# Patient Record
Sex: MALE | Race: WHITE | Employment: FULL TIME | URBAN - METROPOLITAN AREA
[De-identification: names, ages, dates, MRNs, and addresses within clinical notes are randomized per-mention and may not be internally consistent; named-entity substitution may affect disease eponyms.]

---

## 2018-12-18 ENCOUNTER — ANESTHESIA EVENT (OUTPATIENT)
Dept: SURGERY | Facility: CLINIC | Age: 43
End: 2018-12-18
Payer: COMMERCIAL

## 2018-12-19 ENCOUNTER — HOSPITAL ENCOUNTER (INPATIENT)
Facility: CLINIC | Age: 43
LOS: 1 days | Discharge: HOME | End: 2018-12-20
Attending: ORTHOPAEDIC SURGERY | Admitting: ORTHOPAEDIC SURGERY
Payer: COMMERCIAL

## 2018-12-19 ENCOUNTER — APPOINTMENT (OUTPATIENT)
Dept: GENERAL RADIOLOGY | Facility: CLINIC | Age: 43
End: 2018-12-19
Attending: ORTHOPAEDIC SURGERY
Payer: COMMERCIAL

## 2018-12-19 ENCOUNTER — ANESTHESIA (OUTPATIENT)
Dept: SURGERY | Facility: CLINIC | Age: 43
End: 2018-12-19
Payer: COMMERCIAL

## 2018-12-19 DIAGNOSIS — M25.561 ACUTE PAIN OF RIGHT KNEE: Primary | ICD-10-CM

## 2018-12-19 DIAGNOSIS — Z98.890 STATUS POST OSTEOTOMY: ICD-10-CM

## 2018-12-19 PROBLEM — M25.569 KNEE PAIN: Status: ACTIVE | Noted: 2018-12-19

## 2018-12-19 LAB
GLUCOSE BLDC GLUCOMTR-MCNC: 110 MG/DL (ref 70–99)
HGB BLD-MCNC: 14.7 G/DL (ref 13.3–17.7)

## 2018-12-19 PROCEDURE — 0QSB04Z REPOSITION RIGHT LOWER FEMUR WITH INTERNAL FIXATION DEVICE, OPEN APPROACH: ICD-10-PCS | Performed by: ORTHOPAEDIC SURGERY

## 2018-12-19 PROCEDURE — 25000128 H RX IP 250 OP 636: Performed by: ANESTHESIOLOGY

## 2018-12-19 PROCEDURE — 40000278 XR SURGERY CARM FLUORO LESS THAN 5 MIN: Mod: TC

## 2018-12-19 PROCEDURE — 40000170 ZZH STATISTIC PRE-PROCEDURE ASSESSMENT II: Performed by: ORTHOPAEDIC SURGERY

## 2018-12-19 PROCEDURE — 0QUB0KZ SUPPLEMENT RIGHT LOWER FEMUR WITH NONAUTOLOGOUS TISSUE SUBSTITUTE, OPEN APPROACH: ICD-10-PCS | Performed by: ORTHOPAEDIC SURGERY

## 2018-12-19 PROCEDURE — 12000001 ZZH R&B MED SURG/OB UMMC

## 2018-12-19 PROCEDURE — 36000064 ZZH SURGERY LEVEL 4 EA 15 ADDTL MIN - UMMC: Performed by: ORTHOPAEDIC SURGERY

## 2018-12-19 PROCEDURE — 27210794 ZZH OR GENERAL SUPPLY STERILE: Performed by: ORTHOPAEDIC SURGERY

## 2018-12-19 PROCEDURE — 37000009 ZZH ANESTHESIA TECHNICAL FEE, EACH ADDTL 15 MIN: Performed by: ORTHOPAEDIC SURGERY

## 2018-12-19 PROCEDURE — 25000125 ZZHC RX 250: Performed by: NURSE ANESTHETIST, CERTIFIED REGISTERED

## 2018-12-19 PROCEDURE — 0SJC4ZZ INSPECTION OF RIGHT KNEE JOINT, PERCUTANEOUS ENDOSCOPIC APPROACH: ICD-10-PCS | Performed by: ORTHOPAEDIC SURGERY

## 2018-12-19 PROCEDURE — C9290 INJ, BUPIVACAINE LIPOSOME: HCPCS | Performed by: ANESTHESIOLOGY

## 2018-12-19 PROCEDURE — C1762 CONN TISS, HUMAN(INC FASCIA): HCPCS | Performed by: ORTHOPAEDIC SURGERY

## 2018-12-19 PROCEDURE — 25000566 ZZH SEVOFLURANE, EA 15 MIN: Performed by: ORTHOPAEDIC SURGERY

## 2018-12-19 PROCEDURE — 25800025 ZZH RX 258: Performed by: ORTHOPAEDIC SURGERY

## 2018-12-19 PROCEDURE — 0SUC0KZ SUPPLEMENT RIGHT KNEE JOINT WITH NONAUTOLOGOUS TISSUE SUBSTITUTE, OPEN APPROACH: ICD-10-PCS | Performed by: ORTHOPAEDIC SURGERY

## 2018-12-19 PROCEDURE — 37000008 ZZH ANESTHESIA TECHNICAL FEE, 1ST 30 MIN: Performed by: ORTHOPAEDIC SURGERY

## 2018-12-19 PROCEDURE — 25000132 ZZH RX MED GY IP 250 OP 250 PS 637: Performed by: PHYSICIAN ASSISTANT

## 2018-12-19 PROCEDURE — 85018 HEMOGLOBIN: CPT | Performed by: STUDENT IN AN ORGANIZED HEALTH CARE EDUCATION/TRAINING PROGRAM

## 2018-12-19 PROCEDURE — 36415 COLL VENOUS BLD VENIPUNCTURE: CPT | Performed by: STUDENT IN AN ORGANIZED HEALTH CARE EDUCATION/TRAINING PROGRAM

## 2018-12-19 PROCEDURE — 25000128 H RX IP 250 OP 636: Performed by: PHYSICIAN ASSISTANT

## 2018-12-19 PROCEDURE — 25000128 H RX IP 250 OP 636: Performed by: ORTHOPAEDIC SURGERY

## 2018-12-19 PROCEDURE — 25000125 ZZHC RX 250: Performed by: ORTHOPAEDIC SURGERY

## 2018-12-19 PROCEDURE — 71000015 ZZH RECOVERY PHASE 1 LEVEL 2 EA ADDTL HR: Performed by: ORTHOPAEDIC SURGERY

## 2018-12-19 PROCEDURE — 00000146 ZZHCL STATISTIC GLUCOSE BY METER IP

## 2018-12-19 PROCEDURE — 25000128 H RX IP 250 OP 636: Performed by: STUDENT IN AN ORGANIZED HEALTH CARE EDUCATION/TRAINING PROGRAM

## 2018-12-19 PROCEDURE — 25000125 ZZHC RX 250: Performed by: PHYSICIAN ASSISTANT

## 2018-12-19 PROCEDURE — 71000014 ZZH RECOVERY PHASE 1 LEVEL 2 FIRST HR: Performed by: ORTHOPAEDIC SURGERY

## 2018-12-19 PROCEDURE — C1713 ANCHOR/SCREW BN/BN,TIS/BN: HCPCS | Performed by: ORTHOPAEDIC SURGERY

## 2018-12-19 PROCEDURE — 25000132 ZZH RX MED GY IP 250 OP 250 PS 637: Performed by: STUDENT IN AN ORGANIZED HEALTH CARE EDUCATION/TRAINING PROGRAM

## 2018-12-19 PROCEDURE — 36000066 ZZH SURGERY LEVEL 4 W FLUORO 1ST 30 MIN - UMMC: Performed by: ORTHOPAEDIC SURGERY

## 2018-12-19 PROCEDURE — 25000128 H RX IP 250 OP 636: Performed by: NURSE ANESTHETIST, CERTIFIED REGISTERED

## 2018-12-19 DEVICE — IMPLANTABLE DEVICE: Type: IMPLANTABLE DEVICE | Site: KNEE | Status: FUNCTIONAL

## 2018-12-19 DEVICE — GRAFT BONE PUTTY DBX 10ML 038100: Type: IMPLANTABLE DEVICE | Site: KNEE | Status: FUNCTIONAL

## 2018-12-19 DEVICE — GRAFT BONE CHIPS CANC 30ML 400150: Type: IMPLANTABLE DEVICE | Site: KNEE | Status: FUNCTIONAL

## 2018-12-19 DEVICE — IMP ANCHOR ARTHREX HTO IBALANCE CANCELLOUS 24MM AR-13401-24: Type: IMPLANTABLE DEVICE | Site: KNEE | Status: FUNCTIONAL

## 2018-12-19 RX ORDER — AMOXICILLIN 250 MG
1 CAPSULE ORAL 2 TIMES DAILY
Status: DISCONTINUED | OUTPATIENT
Start: 2018-12-19 | End: 2018-12-20 | Stop reason: HOSPADM

## 2018-12-19 RX ORDER — SODIUM CHLORIDE 9 MG/ML
INJECTION, SOLUTION INTRAVENOUS CONTINUOUS
Status: DISCONTINUED | OUTPATIENT
Start: 2018-12-19 | End: 2018-12-20 | Stop reason: HOSPADM

## 2018-12-19 RX ORDER — NALOXONE HYDROCHLORIDE 0.4 MG/ML
.1-.4 INJECTION, SOLUTION INTRAMUSCULAR; INTRAVENOUS; SUBCUTANEOUS
Status: DISCONTINUED | OUTPATIENT
Start: 2018-12-19 | End: 2018-12-20 | Stop reason: HOSPADM

## 2018-12-19 RX ORDER — MAGNESIUM HYDROXIDE 1200 MG/15ML
LIQUID ORAL PRN
Status: DISCONTINUED | OUTPATIENT
Start: 2018-12-19 | End: 2018-12-19 | Stop reason: HOSPADM

## 2018-12-19 RX ORDER — LIDOCAINE HYDROCHLORIDE 20 MG/ML
INJECTION, SOLUTION INFILTRATION; PERINEURAL PRN
Status: DISCONTINUED | OUTPATIENT
Start: 2018-12-19 | End: 2018-12-19

## 2018-12-19 RX ORDER — FENTANYL CITRATE 50 UG/ML
25-50 INJECTION, SOLUTION INTRAMUSCULAR; INTRAVENOUS
Status: DISCONTINUED | OUTPATIENT
Start: 2018-12-19 | End: 2018-12-19 | Stop reason: HOSPADM

## 2018-12-19 RX ORDER — PAROXETINE 10 MG/1
10 TABLET, FILM COATED ORAL EVERY MORNING
COMMUNITY

## 2018-12-19 RX ORDER — SODIUM CHLORIDE, SODIUM LACTATE, POTASSIUM CHLORIDE, CALCIUM CHLORIDE 600; 310; 30; 20 MG/100ML; MG/100ML; MG/100ML; MG/100ML
INJECTION, SOLUTION INTRAVENOUS CONTINUOUS PRN
Status: DISCONTINUED | OUTPATIENT
Start: 2018-12-19 | End: 2018-12-19

## 2018-12-19 RX ORDER — CEFAZOLIN SODIUM 2 G/100ML
2 INJECTION, SOLUTION INTRAVENOUS
Status: DISCONTINUED | OUTPATIENT
Start: 2018-12-19 | End: 2018-12-19 | Stop reason: HOSPADM

## 2018-12-19 RX ORDER — EPHEDRINE SULFATE 50 MG/ML
INJECTION, SOLUTION INTRAMUSCULAR; INTRAVENOUS; SUBCUTANEOUS PRN
Status: DISCONTINUED | OUTPATIENT
Start: 2018-12-19 | End: 2018-12-19

## 2018-12-19 RX ORDER — NALOXONE HYDROCHLORIDE 0.4 MG/ML
.1-.4 INJECTION, SOLUTION INTRAMUSCULAR; INTRAVENOUS; SUBCUTANEOUS
Status: DISCONTINUED | OUTPATIENT
Start: 2018-12-19 | End: 2018-12-19 | Stop reason: HOSPADM

## 2018-12-19 RX ORDER — PROPOFOL 10 MG/ML
INJECTION, EMULSION INTRAVENOUS PRN
Status: DISCONTINUED | OUTPATIENT
Start: 2018-12-19 | End: 2018-12-19

## 2018-12-19 RX ORDER — PROCHLORPERAZINE MALEATE 5 MG
10 TABLET ORAL EVERY 6 HOURS PRN
Status: DISCONTINUED | OUTPATIENT
Start: 2018-12-19 | End: 2018-12-20 | Stop reason: HOSPADM

## 2018-12-19 RX ORDER — ONDANSETRON 2 MG/ML
4 INJECTION INTRAMUSCULAR; INTRAVENOUS EVERY 30 MIN PRN
Status: DISCONTINUED | OUTPATIENT
Start: 2018-12-19 | End: 2018-12-19 | Stop reason: HOSPADM

## 2018-12-19 RX ORDER — METOCLOPRAMIDE HYDROCHLORIDE 5 MG/ML
10 INJECTION INTRAMUSCULAR; INTRAVENOUS EVERY 6 HOURS PRN
Status: DISCONTINUED | OUTPATIENT
Start: 2018-12-19 | End: 2018-12-20 | Stop reason: HOSPADM

## 2018-12-19 RX ORDER — SODIUM CHLORIDE, SODIUM LACTATE, POTASSIUM CHLORIDE, CALCIUM CHLORIDE 600; 310; 30; 20 MG/100ML; MG/100ML; MG/100ML; MG/100ML
INJECTION, SOLUTION INTRAVENOUS CONTINUOUS
Status: DISCONTINUED | OUTPATIENT
Start: 2018-12-19 | End: 2018-12-19 | Stop reason: HOSPADM

## 2018-12-19 RX ORDER — HYDROXYZINE HYDROCHLORIDE 25 MG/1
25 TABLET, FILM COATED ORAL EVERY 6 HOURS PRN
Status: DISCONTINUED | OUTPATIENT
Start: 2018-12-19 | End: 2018-12-20 | Stop reason: HOSPADM

## 2018-12-19 RX ORDER — LIDOCAINE 40 MG/G
CREAM TOPICAL
Status: DISCONTINUED | OUTPATIENT
Start: 2018-12-19 | End: 2018-12-20 | Stop reason: HOSPADM

## 2018-12-19 RX ORDER — ACETAMINOPHEN 325 MG/1
975 TABLET ORAL EVERY 8 HOURS
Status: DISCONTINUED | OUTPATIENT
Start: 2018-12-19 | End: 2018-12-20 | Stop reason: HOSPADM

## 2018-12-19 RX ORDER — ONDANSETRON 2 MG/ML
INJECTION INTRAMUSCULAR; INTRAVENOUS PRN
Status: DISCONTINUED | OUTPATIENT
Start: 2018-12-19 | End: 2018-12-19

## 2018-12-19 RX ORDER — PROPOFOL 10 MG/ML
INJECTION, EMULSION INTRAVENOUS CONTINUOUS PRN
Status: DISCONTINUED | OUTPATIENT
Start: 2018-12-19 | End: 2018-12-19

## 2018-12-19 RX ORDER — AMOXICILLIN 250 MG
2 CAPSULE ORAL 2 TIMES DAILY
Status: DISCONTINUED | OUTPATIENT
Start: 2018-12-19 | End: 2018-12-20 | Stop reason: HOSPADM

## 2018-12-19 RX ORDER — CEFAZOLIN SODIUM 1 G/3ML
1 INJECTION, POWDER, FOR SOLUTION INTRAMUSCULAR; INTRAVENOUS EVERY 8 HOURS
Status: COMPLETED | OUTPATIENT
Start: 2018-12-19 | End: 2018-12-20

## 2018-12-19 RX ORDER — ACETAMINOPHEN 325 MG/1
975 TABLET ORAL ONCE
Status: COMPLETED | OUTPATIENT
Start: 2018-12-19 | End: 2018-12-19

## 2018-12-19 RX ORDER — KETOROLAC TROMETHAMINE 30 MG/ML
INJECTION, SOLUTION INTRAMUSCULAR; INTRAVENOUS PRN
Status: DISCONTINUED | OUTPATIENT
Start: 2018-12-19 | End: 2018-12-19

## 2018-12-19 RX ORDER — ONDANSETRON 4 MG/1
4 TABLET, ORALLY DISINTEGRATING ORAL EVERY 6 HOURS PRN
Status: DISCONTINUED | OUTPATIENT
Start: 2018-12-19 | End: 2018-12-20 | Stop reason: HOSPADM

## 2018-12-19 RX ORDER — ONDANSETRON 4 MG/1
4 TABLET, ORALLY DISINTEGRATING ORAL EVERY 30 MIN PRN
Status: DISCONTINUED | OUTPATIENT
Start: 2018-12-19 | End: 2018-12-19 | Stop reason: HOSPADM

## 2018-12-19 RX ORDER — BUPIVACAINE HYDROCHLORIDE 2.5 MG/ML
INJECTION, SOLUTION EPIDURAL; INFILTRATION; INTRACAUDAL PRN
Status: DISCONTINUED | OUTPATIENT
Start: 2018-12-19 | End: 2018-12-19

## 2018-12-19 RX ORDER — MORPHINE SULFATE 15 MG/1
15 TABLET, FILM COATED, EXTENDED RELEASE ORAL EVERY 12 HOURS
Status: DISCONTINUED | OUTPATIENT
Start: 2018-12-19 | End: 2018-12-20 | Stop reason: HOSPADM

## 2018-12-19 RX ORDER — ATORVASTATIN CALCIUM 40 MG/1
40 TABLET, FILM COATED ORAL DAILY
COMMUNITY

## 2018-12-19 RX ORDER — FENTANYL CITRATE 50 UG/ML
INJECTION, SOLUTION INTRAMUSCULAR; INTRAVENOUS PRN
Status: DISCONTINUED | OUTPATIENT
Start: 2018-12-19 | End: 2018-12-19

## 2018-12-19 RX ORDER — LIDOCAINE 40 MG/G
CREAM TOPICAL
Status: DISCONTINUED | OUTPATIENT
Start: 2018-12-19 | End: 2018-12-19 | Stop reason: HOSPADM

## 2018-12-19 RX ORDER — LOSARTAN POTASSIUM 25 MG/1
25 TABLET ORAL DAILY
COMMUNITY

## 2018-12-19 RX ORDER — OXYCODONE HYDROCHLORIDE 5 MG/1
5 TABLET ORAL EVERY 4 HOURS PRN
Status: DISCONTINUED | OUTPATIENT
Start: 2018-12-19 | End: 2018-12-19

## 2018-12-19 RX ORDER — ONDANSETRON 2 MG/ML
4 INJECTION INTRAMUSCULAR; INTRAVENOUS EVERY 6 HOURS PRN
Status: DISCONTINUED | OUTPATIENT
Start: 2018-12-19 | End: 2018-12-20 | Stop reason: HOSPADM

## 2018-12-19 RX ORDER — BUPIVACAINE HYDROCHLORIDE AND EPINEPHRINE 2.5; 5 MG/ML; UG/ML
INJECTION, SOLUTION INFILTRATION; PERINEURAL PRN
Status: DISCONTINUED | OUTPATIENT
Start: 2018-12-19 | End: 2018-12-19 | Stop reason: HOSPADM

## 2018-12-19 RX ORDER — CEFAZOLIN SODIUM 1 G/3ML
1 INJECTION, POWDER, FOR SOLUTION INTRAMUSCULAR; INTRAVENOUS SEE ADMIN INSTRUCTIONS
Status: DISCONTINUED | OUTPATIENT
Start: 2018-12-19 | End: 2018-12-19 | Stop reason: HOSPADM

## 2018-12-19 RX ORDER — OXYCODONE HYDROCHLORIDE 5 MG/1
5-10 TABLET ORAL
Status: DISCONTINUED | OUTPATIENT
Start: 2018-12-19 | End: 2018-12-20 | Stop reason: HOSPADM

## 2018-12-19 RX ORDER — METOCLOPRAMIDE 10 MG/1
10 TABLET ORAL EVERY 6 HOURS PRN
Status: DISCONTINUED | OUTPATIENT
Start: 2018-12-19 | End: 2018-12-20 | Stop reason: HOSPADM

## 2018-12-19 RX ORDER — FLUMAZENIL 0.1 MG/ML
0.2 INJECTION, SOLUTION INTRAVENOUS
Status: DISCONTINUED | OUTPATIENT
Start: 2018-12-19 | End: 2018-12-19 | Stop reason: HOSPADM

## 2018-12-19 RX ORDER — HYDROMORPHONE HYDROCHLORIDE 1 MG/ML
.3-.5 INJECTION, SOLUTION INTRAMUSCULAR; INTRAVENOUS; SUBCUTANEOUS EVERY 10 MIN PRN
Status: DISCONTINUED | OUTPATIENT
Start: 2018-12-19 | End: 2018-12-19 | Stop reason: HOSPADM

## 2018-12-19 RX ORDER — ACETAMINOPHEN 325 MG/1
650 TABLET ORAL EVERY 4 HOURS PRN
Status: DISCONTINUED | OUTPATIENT
Start: 2018-12-22 | End: 2018-12-20 | Stop reason: HOSPADM

## 2018-12-19 RX ORDER — MEPERIDINE HYDROCHLORIDE 25 MG/ML
12.5 INJECTION INTRAMUSCULAR; INTRAVENOUS; SUBCUTANEOUS
Status: DISCONTINUED | OUTPATIENT
Start: 2018-12-19 | End: 2018-12-19 | Stop reason: HOSPADM

## 2018-12-19 RX ORDER — ASPIRIN 81 MG/1
162 TABLET ORAL DAILY
Status: DISCONTINUED | OUTPATIENT
Start: 2018-12-20 | End: 2018-12-20 | Stop reason: HOSPADM

## 2018-12-19 RX ORDER — GABAPENTIN 100 MG/1
300 CAPSULE ORAL ONCE
Status: COMPLETED | OUTPATIENT
Start: 2018-12-19 | End: 2018-12-19

## 2018-12-19 RX ORDER — CEFAZOLIN SODIUM 1 G/3ML
INJECTION, POWDER, FOR SOLUTION INTRAMUSCULAR; INTRAVENOUS PRN
Status: DISCONTINUED | OUTPATIENT
Start: 2018-12-19 | End: 2018-12-19

## 2018-12-19 RX ADMIN — FENTANYL CITRATE 50 MCG: 50 INJECTION, SOLUTION INTRAMUSCULAR; INTRAVENOUS at 13:11

## 2018-12-19 RX ADMIN — ACETAMINOPHEN 975 MG: 325 TABLET, FILM COATED ORAL at 16:37

## 2018-12-19 RX ADMIN — BUPIVACAINE HYDROCHLORIDE 10 ML: 2.5 INJECTION, SOLUTION EPIDURAL; INFILTRATION; INTRACAUDAL at 09:23

## 2018-12-19 RX ADMIN — CEFAZOLIN SODIUM 1 G: 1 INJECTION, POWDER, FOR SOLUTION INTRAMUSCULAR; INTRAVENOUS at 20:34

## 2018-12-19 RX ADMIN — KETOROLAC TROMETHAMINE 30 MG: 30 INJECTION, SOLUTION INTRAMUSCULAR at 12:13

## 2018-12-19 RX ADMIN — MORPHINE SULFATE 15 MG: 15 TABLET, EXTENDED RELEASE ORAL at 16:37

## 2018-12-19 RX ADMIN — PROPOFOL: 10 INJECTION, EMULSION INTRAVENOUS at 11:49

## 2018-12-19 RX ADMIN — CEFAZOLIN 2 G: 1 INJECTION, POWDER, FOR SOLUTION INTRAMUSCULAR; INTRAVENOUS at 10:05

## 2018-12-19 RX ADMIN — Medication 5 MG: at 10:48

## 2018-12-19 RX ADMIN — Medication 5 MG: at 10:58

## 2018-12-19 RX ADMIN — SODIUM CHLORIDE, POTASSIUM CHLORIDE, SODIUM LACTATE AND CALCIUM CHLORIDE: 600; 310; 30; 20 INJECTION, SOLUTION INTRAVENOUS at 11:32

## 2018-12-19 RX ADMIN — FENTANYL CITRATE 50 MCG: 50 INJECTION, SOLUTION INTRAMUSCULAR; INTRAVENOUS at 09:54

## 2018-12-19 RX ADMIN — Medication 0.3 MG: at 13:18

## 2018-12-19 RX ADMIN — SODIUM CHLORIDE 1 G: 9 INJECTION, SOLUTION INTRAVENOUS at 10:05

## 2018-12-19 RX ADMIN — FENTANYL CITRATE 50 MCG: 50 INJECTION, SOLUTION INTRAMUSCULAR; INTRAVENOUS at 11:17

## 2018-12-19 RX ADMIN — SODIUM CHLORIDE, POTASSIUM CHLORIDE, SODIUM LACTATE AND CALCIUM CHLORIDE: 600; 310; 30; 20 INJECTION, SOLUTION INTRAVENOUS at 09:50

## 2018-12-19 RX ADMIN — CEFAZOLIN 1 G: 1 INJECTION, POWDER, FOR SOLUTION INTRAMUSCULAR; INTRAVENOUS at 11:52

## 2018-12-19 RX ADMIN — OXYCODONE HYDROCHLORIDE 5 MG: 5 TABLET ORAL at 14:56

## 2018-12-19 RX ADMIN — SODIUM CHLORIDE 1 G: 9 INJECTION, SOLUTION INTRAVENOUS at 11:15

## 2018-12-19 RX ADMIN — FENTANYL CITRATE 50 MCG: 50 INJECTION, SOLUTION INTRAMUSCULAR; INTRAVENOUS at 12:58

## 2018-12-19 RX ADMIN — SODIUM CHLORIDE: 9 INJECTION, SOLUTION INTRAVENOUS at 14:56

## 2018-12-19 RX ADMIN — SENNOSIDES AND DOCUSATE SODIUM 2 TABLET: 8.6; 5 TABLET ORAL at 20:33

## 2018-12-19 RX ADMIN — FENTANYL CITRATE 50 MCG: 50 INJECTION, SOLUTION INTRAMUSCULAR; INTRAVENOUS at 12:52

## 2018-12-19 RX ADMIN — GABAPENTIN 300 MG: 300 CAPSULE ORAL at 09:38

## 2018-12-19 RX ADMIN — PROPOFOL 30 MCG/KG/MIN: 10 INJECTION, EMULSION INTRAVENOUS at 10:00

## 2018-12-19 RX ADMIN — Medication 5 MG: at 10:51

## 2018-12-19 RX ADMIN — OXYCODONE HYDROCHLORIDE 5 MG: 5 TABLET ORAL at 16:37

## 2018-12-19 RX ADMIN — ONDANSETRON 4 MG: 2 INJECTION INTRAMUSCULAR; INTRAVENOUS at 12:10

## 2018-12-19 RX ADMIN — LIDOCAINE HYDROCHLORIDE 80 MG: 20 INJECTION, SOLUTION INFILTRATION; PERINEURAL at 09:54

## 2018-12-19 RX ADMIN — PROPOFOL 300 MG: 10 INJECTION, EMULSION INTRAVENOUS at 09:54

## 2018-12-19 RX ADMIN — FENTANYL CITRATE 50 MCG: 50 INJECTION, SOLUTION INTRAMUSCULAR; INTRAVENOUS at 08:23

## 2018-12-19 RX ADMIN — BUPIVACAINE 10 ML: 13.3 INJECTION, SUSPENSION, LIPOSOMAL INFILTRATION at 09:23

## 2018-12-19 RX ADMIN — ACETAMINOPHEN 975 MG: 325 TABLET, FILM COATED ORAL at 09:37

## 2018-12-19 RX ADMIN — MIDAZOLAM HYDROCHLORIDE 1 MG: 1 INJECTION, SOLUTION INTRAMUSCULAR; INTRAVENOUS at 08:23

## 2018-12-19 RX ADMIN — OXYCODONE HYDROCHLORIDE 10 MG: 5 TABLET ORAL at 20:33

## 2018-12-19 RX ADMIN — Medication 0.2 MG: at 13:24

## 2018-12-19 RX ADMIN — FENTANYL CITRATE 50 MCG: 50 INJECTION, SOLUTION INTRAMUSCULAR; INTRAVENOUS at 13:03

## 2018-12-19 ASSESSMENT — ACTIVITIES OF DAILY LIVING (ADL)
ADLS_ACUITY_SCORE: 18
ADLS_ACUITY_SCORE: 17

## 2018-12-19 ASSESSMENT — MIFFLIN-ST. JEOR: SCORE: 1614.63

## 2018-12-19 NOTE — ANESTHESIA CARE TRANSFER NOTE
Patient: Agustin Jacinto    Procedure(s):  Right Knee Diagnostic Arthroscopy  Medial Femoral Condyle Osteochondral Allograft  Proximal Right Tibial Osteotomy    Diagnosis: Right Knee Medial Compartment Condral Lesion  Diagnosis Additional Information: No value filed.    Anesthesia Type:   No value filed.     Note:  Airway :Face Mask  Patient transferred to:PACU  Comments: Stable, awake, comfortable, temp 36.5, report to PACU, RNHandoff Report: Identifed the Patient, Identified the Reponsible Provider, Reviewed the pertinent medical history, Discussed the surgical course, Reviewed Intra-OP anesthesia mangement and issues during anesthesia, Set expectations for post-procedure period and Allowed opportunity for questions and acknowledgement of understanding      Vitals: (Last set prior to Anesthesia Care Transfer)    CRNA VITALS  12/19/2018 1213 - 12/19/2018 1254      12/19/2018             Pulse:  91    SpO2:  100 %    Resp Rate (observed):  1  (Abnormal)                 Electronically Signed By: BRAXTON Montez CRNA  December 19, 2018  12:54 PM

## 2018-12-19 NOTE — PLAN OF CARE
Pt arrived on unit around 1430. Vital sign protocol initiated. Pt denied N/V. Clear liquid diet. 5mg Oxycodone given for pain. Hinged knee brace in place, PACU to bring CPM to floor. Capno on patient. Wife is at the bedside. Call light within reach, able to make needs known. Will continue to monitor.

## 2018-12-19 NOTE — PROGRESS NOTES
PACU to Inpatient Nursing Handoff    Patient Agustin Jacinto is a 43 year old male who speaks English.   Procedure Procedure(s):  Right Knee Diagnostic Arthroscopy  Medial Femoral Condyle Osteochondral Allograft  Proximal Right Tibial Osteotomy   Surgeon(s) Primary: Agustin Rosado MD  Assisting: Sriram Ritter PA-C  Fellow - Assisting: Myriam Caballero MD     Not on File    Isolation  No active isolations     Past Medical History   has a past medical history of PONV (postoperative nausea and vomiting).    Anesthesia Combined General with Block   Dermatome Level     Preop Meds acetaminophen (Tylenol) - time given: 0937  gabapentin (Neurontin) - time given: 0937  versed 1mg, Fentanyl 50mcg  - time given: 0945   Nerve block Femoral (right).  Location:right. Med:Exparel (liposomal bupivacaine). Time given: 0945   Intraop Meds fentanyl (Sublimaze): 100 mcg total  ketorolac (Toradol): last given at 1213  ondansetron (Zofran): last given at 1210   Local Meds Yes   Antibiotics cefazolin (Ancef) - last given at 1152     Pain Patient Currently in Pain: yes  Comfort: tolerable with discomfort  Pain Control: inadequate pain control   PACU meds  fentanyl (Sublimaze): 200 mcg (total dose) last given at 1315   hydromorphone (Dilaudid): 0.5 mg (total dose) last given at 1330    PCA / epidural No   Capnography     Telemetry ECG Rhythm: Sinus rhythm   Inpatient Telemetry Monitor Ordered? No        Labs Glucose No results found for: GLC    Hgb Lab Results   Component Value Date    HGB 14.7 12/19/2018       INR No results found for: INR   PACU Imaging Not applicable     Wound/Incision Incision/Surgical Site 12/19/18 Right Knee (Active)   Incision Assessment UTV 12/19/2018  1:30 PM   Closure Adhesive strip(s) 12/19/2018  1:30 PM   Dressing Intervention Clean, dry, intact 12/19/2018  1:30 PM   Number of days: 0      CMS        Equipment ice pack   Other LDA       IV Access Peripheral IV 12/19/18 Left;Posterior Hand  (Active)   Site Assessment WDL 12/19/2018 12:46 PM   Line Status Infusing;Checked every 1 hour 12/19/2018 12:46 PM   Phlebitis Scale 0-->no symptoms 12/19/2018 12:46 PM   Infiltration Scale 0 12/19/2018 12:46 PM   Infiltration Site Treatment Method  None 12/19/2018 12:46 PM   Number of days: 0      Blood Products Not applicable  mL   Intake/Output Date 12/19/18 0700 - 12/20/18 0659   Shift 8391-6643 1279-2382 4538-0998 24 Hour Total   INTAKE   I.V. 1300   1300   Shift Total(mL/kg) 1300(16.97)   1300(16.97)   OUTPUT   Blood 100   100   Shift Total(mL/kg) 100(1.31)   100(1.31)   Weight (kg) 76.6 76.6 76.6 76.6      Drains / Castrejon     Time of void PreOp Void Prior to Procedure: 0815 (12/19/18 0833)    PostOp Urine Occurrence: 1 (12/19/18 0815)    Diapered? No   Bladder Scan     PO    tolerating sips     Vitals    B/P: 131/81  T: 97.7  F (36.5  C)    Temp src: Axillary  P:  Pulse: 70 (12/19/18 1315)    Heart Rate: 85 (12/19/18 1315)     R: 10  O2:  SpO2: 97 %    O2 Device: Nasal cannula (12/19/18 1300)    Oxygen Delivery: 2 LPM (12/19/18 1300)         Family/support present family   Patient belongings     Patient transported on cart   DC meds/scripts (obs/outpt) Not applicable   Inpatient Pain Meds Released? Yes       Special needs/considerations None   Tasks needing completion None       Jennifer Cheek, RN  ASCOM [id number]

## 2018-12-19 NOTE — ANESTHESIA PROCEDURE NOTES
Peripheral Nerve Block Procedure Note    Staff:     Anesthesiologist:  Glenn Knowles MD    Resident/CRNA:  Dolly Nye MD    Block performed by resident/CRNA in the presence of a teaching physician    Location: Pre-op  Procedure Start/Stop TImes:      12/19/2018 9:20 AM    patient identified, IV checked, site marked, risks and benefits discussed, informed consent, monitors and equipment checked, pre-op evaluation, at physician/surgeon's request and post-op pain management      Correct Patient: Yes      Correct Position: Yes      Correct Site: Yes      Correct Procedure: Yes      Correct Laterality:  Yes    Site Marked:  Yes  Procedure details:     Procedure:  Femoral    ASA:  2    Diagnosis:  R knee pain    Laterality:  Right    Position:  Supine    Sterile Prep: chloraprep      Needle:  Short bevel    Needle gauge:  21    Ultrasound: Yes      Ultrasound used to identify targeted nerve, plexus, or vascular structure and placed a needle adjacent to it      Permanent Image entered into patiient's record      Abnormal pain on injection: No      Blood Aspirated: No      Paresthesias:  No    Bleeding at site: No      Bolus via:  Needle    Infusion Method:  Single Shot    Complications:  None

## 2018-12-19 NOTE — BRIEF OP NOTE
[facility] Brief Operative Note    Pre-operative diagnosis: Right Knee Medial Compartment Condral Lesion   Post-operative diagnosis Right Knee Medial Compartment Condral Lesion   Procedure: Procedure(s):  Right Knee Arthroscopy  Proximal Right Tibial Osteotomy, Possible Medial Femoral Condyle , Osteochondral Allograft  OSTEOTOMY TIBIA   Surgeon(s): Surgeon(s) and Role:     * Agustin Rosado MD - Primary   Estimated blood loss: 100cc    Specimens: * No specimens in log *   Findings: See operative report       PLAN:  - Admit to inpatient for pain control, abx, PT  - Diet as tolerated  - Abx x 24hrs  - 162 mg asa x 1 month starting POD1  - Dressing change prior to discharge, shower POD5  - TTWB wtih HKB locked in extension when ambulating  - CPM 0-30, advance as tolerated, use 6-8hrs daily  - PT: crutch training, ADLs, follow HTO protocol  - Dispo: home when tolerating pain on oral meds, progression with PT      Follow up at [facility] with Dr. Rosado 12/27 @ 11:30am

## 2018-12-19 NOTE — ANESTHESIA PREPROCEDURE EVALUATION
"Anesthesia Pre-Procedure Evaluation    Patient: Agustin Jacinto   MRN:     [id number] Gender:   male   Age:    43 year old :      1975        Preoperative Diagnosis: Right Knee Medial Compartment Condral Lesion   Procedure(s):  Right Knee Arthroscopy  Proximal Right Tibial Osteotomy, Possible Medial Femoral Condyle , Osteochondral Allograft  OSTEOTOMY TIBIA     Past Medical History:   Diagnosis Date     PONV (postoperative nausea and vomiting)       History reviewed. No pertinent surgical history.            JZG FV AN PHYSICAL EXAM    Lab Results   Component Value Date    HGB 14.7 2018       Preop Vitals  BP Readings from Last 3 Encounters:   18 (!) 144/102    Pulse Readings from Last 3 Encounters:   18 75      Resp Readings from Last 3 Encounters:   18 16    SpO2 Readings from Last 3 Encounters:   18 97%      Temp Readings from Last 1 Encounters:   18 36.7  C (98.1  F) (Oral)    Ht Readings from Last 1 Encounters:   18 1.702 m (5' 7\")      Wt Readings from Last 1 Encounters:   18 76.6 kg (168 lb 14 oz)    Estimated body mass index is 26.45 kg/m  as calculated from the following:    Height as of this encounter: 1.702 m (5' 7\").    Weight as of this encounter: 76.6 kg (168 lb 14 oz).     LDA:  Peripheral IV 18 Left;Posterior Hand (Active)   Number of days: 0            Assessment:   ASA SCORE: 2    NPO Status: > 2 hours since completed Clear Liquids; > 6 hours since completed Solid Foods   Documentation: H&P complete; Preop Testing complete; Consents complete   Proceeding: Proceed without further delay  Tobacco Use:  Active user of Tobacco     Plan:   Anes. Type:  General   Pre-Induction: Midazolam IV; Acetaminophen PO; Gabapentin PO   Induction:  IV (Standard)   Airway: LMA   Access/Monitoring: PIV   Maintenance: Balanced   Emergence: Procedure Site   Logistics: Same Day Surgery     Postop Pain/Sedation Strategy:  Standard-Options: Opioids PRN     PONV " Management:  Adult Risk Factors:, H/o PONV or Motion Sickness, Postop Opioids  Prevention: Ondansetron; Dexamethasone; Propofol Infusion     CONSENT: Direct conversation   Plan and risks discussed with: Patient                            Musa Riddle MD

## 2018-12-19 NOTE — ANESTHESIA POSTPROCEDURE EVALUATION
Anesthesia POST Procedure Evaluation    Patient: Agustin Jacinto   MRN:     [id number] Gender:   male   Age:    43 year old :      1975        Preoperative Diagnosis: Right Knee Medial Compartment Condral Lesion   Procedure(s):  Right Knee Diagnostic Arthroscopy  Medial Femoral Condyle Osteochondral Allograft  Proximal Right Tibial Osteotomy   Postop Comments: No value filed.       Anesthesia Type:  General    Reportable Event: NO     PAIN: Uncomplicated   Sign Out status: Comfortable, Well controlled pain     PONV: No PONV   Sign Out status:  No Nausea or Vomiting     Neuro/Psych: Uneventful perioperative course   Sign Out Status: Preoperative baseline; Age appropriate mentation     Airway/Resp.: Uneventful perioperative course   Sign Out Status: Non labored breathing, age appropriate RR; Resp. Status within EXPECTED Parameters     CV: Uneventful perioperative course   Sign Out status: Appropriate BP and perfusion indices; Appropriate HR/Rhythm     Disposition:   Sign Out in:  PACU  Disposition:  Floor  Recovery Course: Uneventful  Follow-Up: Not required           Last Anesthesia Record Vitals:  CRNA VITALS  2018 1213 - 2018 1311      2018             NIBP:  135/87    Pulse:  88          Last PACU/Preop Vitals:  Vitals:    18 0835 18 1246 18 1300   BP:  135/87 121/74   Pulse:  73 67   Resp: 9  11   Temp:  36.5  C (97.7  F)    SpO2: 97% 100% 95%         Electronically Signed By: Musa Riddle MD, 2018, 1:11 PM

## 2018-12-20 ENCOUNTER — APPOINTMENT (OUTPATIENT)
Dept: PHYSICAL THERAPY | Facility: CLINIC | Age: 43
End: 2018-12-20
Attending: PHYSICIAN ASSISTANT
Payer: COMMERCIAL

## 2018-12-20 VITALS
DIASTOLIC BLOOD PRESSURE: 68 MMHG | OXYGEN SATURATION: 91 % | WEIGHT: 168.87 LBS | HEIGHT: 67 IN | TEMPERATURE: 97.8 F | RESPIRATION RATE: 16 BRPM | HEART RATE: 82 BPM | BODY MASS INDEX: 26.51 KG/M2 | SYSTOLIC BLOOD PRESSURE: 138 MMHG

## 2018-12-20 LAB — HGB BLD-MCNC: 13.1 G/DL (ref 13.3–17.7)

## 2018-12-20 PROCEDURE — 25000128 H RX IP 250 OP 636: Performed by: PHYSICIAN ASSISTANT

## 2018-12-20 PROCEDURE — 97161 PT EVAL LOW COMPLEX 20 MIN: CPT | Mod: GP | Performed by: PHYSICAL THERAPIST

## 2018-12-20 PROCEDURE — 97116 GAIT TRAINING THERAPY: CPT | Mod: GP | Performed by: PHYSICAL THERAPIST

## 2018-12-20 PROCEDURE — 36415 COLL VENOUS BLD VENIPUNCTURE: CPT | Performed by: PHYSICIAN ASSISTANT

## 2018-12-20 PROCEDURE — 85018 HEMOGLOBIN: CPT | Performed by: PHYSICIAN ASSISTANT

## 2018-12-20 PROCEDURE — 40000193 ZZH STATISTIC PT WARD VISIT: Performed by: PHYSICAL THERAPIST

## 2018-12-20 PROCEDURE — 25000132 ZZH RX MED GY IP 250 OP 250 PS 637: Performed by: PHYSICIAN ASSISTANT

## 2018-12-20 PROCEDURE — 97530 THERAPEUTIC ACTIVITIES: CPT | Mod: GP | Performed by: PHYSICAL THERAPIST

## 2018-12-20 RX ORDER — ACETAMINOPHEN 325 MG/1
975 TABLET ORAL EVERY 8 HOURS
Qty: 100 TABLET | Refills: 0 | Status: SHIPPED | OUTPATIENT
Start: 2018-12-20 | End: 2019-01-19

## 2018-12-20 RX ORDER — HYDROXYZINE HYDROCHLORIDE 25 MG/1
25 TABLET, FILM COATED ORAL EVERY 6 HOURS PRN
Qty: 30 TABLET | Refills: 0 | Status: SHIPPED | OUTPATIENT
Start: 2018-12-20 | End: 2018-12-30

## 2018-12-20 RX ORDER — AMOXICILLIN 250 MG
1 CAPSULE ORAL 2 TIMES DAILY PRN
Qty: 60 TABLET | Refills: 0 | Status: SHIPPED | OUTPATIENT
Start: 2018-12-20 | End: 2019-01-19

## 2018-12-20 RX ORDER — MORPHINE SULFATE 15 MG/1
15 TABLET, FILM COATED, EXTENDED RELEASE ORAL EVERY 12 HOURS
Qty: 3 TABLET | Refills: 0 | Status: SHIPPED | OUTPATIENT
Start: 2018-12-20 | End: 2018-12-22

## 2018-12-20 RX ORDER — OXYCODONE HYDROCHLORIDE 5 MG/1
5-10 TABLET ORAL EVERY 4 HOURS PRN
Qty: 60 TABLET | Refills: 0 | Status: SHIPPED | OUTPATIENT
Start: 2018-12-20

## 2018-12-20 RX ADMIN — ACETAMINOPHEN 975 MG: 325 TABLET, FILM COATED ORAL at 07:35

## 2018-12-20 RX ADMIN — OXYCODONE HYDROCHLORIDE 10 MG: 5 TABLET ORAL at 11:21

## 2018-12-20 RX ADMIN — ASPIRIN 162 MG: 81 TABLET, COATED ORAL at 07:34

## 2018-12-20 RX ADMIN — SODIUM CHLORIDE: 9 INJECTION, SOLUTION INTRAVENOUS at 04:20

## 2018-12-20 RX ADMIN — SENNOSIDES AND DOCUSATE SODIUM 2 TABLET: 8.6; 5 TABLET ORAL at 07:35

## 2018-12-20 RX ADMIN — ACETAMINOPHEN 975 MG: 325 TABLET, FILM COATED ORAL at 00:16

## 2018-12-20 RX ADMIN — OXYCODONE HYDROCHLORIDE 10 MG: 5 TABLET ORAL at 00:16

## 2018-12-20 RX ADMIN — CEFAZOLIN SODIUM 1 G: 1 INJECTION, POWDER, FOR SOLUTION INTRAMUSCULAR; INTRAVENOUS at 04:19

## 2018-12-20 RX ADMIN — OXYCODONE HYDROCHLORIDE 10 MG: 5 TABLET ORAL at 07:39

## 2018-12-20 RX ADMIN — MORPHINE SULFATE 15 MG: 15 TABLET, EXTENDED RELEASE ORAL at 04:19

## 2018-12-20 RX ADMIN — OXYCODONE HYDROCHLORIDE 10 MG: 5 TABLET ORAL at 14:11

## 2018-12-20 ASSESSMENT — ACTIVITIES OF DAILY LIVING (ADL)
ADLS_ACUITY_SCORE: 11
RETIRED_EATING: 0-->INDEPENDENT
TRANSFERRING: 0-->INDEPENDENT
AMBULATION: 0-->INDEPENDENT
COGNITION: 0 - NO COGNITION ISSUES REPORTED
DRESS: 0-->INDEPENDENT
ADLS_ACUITY_SCORE: 17
ADLS_ACUITY_SCORE: 17
SWALLOWING: 0-->SWALLOWS FOODS/LIQUIDS WITHOUT DIFFICULTY
ADLS_ACUITY_SCORE: 11
TOILETING: 0-->INDEPENDENT
RETIRED_COMMUNICATION: 0-->UNDERSTANDS/COMMUNICATES WITHOUT DIFFICULTY
BATHING: 0-->INDEPENDENT
FALL_HISTORY_WITHIN_LAST_SIX_MONTHS: NO

## 2018-12-20 NOTE — DISCHARGE SUMMARY
[facility] Orthopaedic Surgery Discharge Summary    Agustin Jacinto MRN# [id number]   Age: 43 year old YOB: 1975       Date of Admission:  12/19/2018  Date of Discharge::  12/20/2018   Admitting Physician:  Agustin Rosado MD  Discharge To:  Home   Primary Care Physician:      Alejandra Ruano          Admission Diagnoses:   Right Knee Medial Compartment Condral Lesion  Knee pain         Discharge Diagnosis:   same           Procedures Performed:   Right Knee Arthroscopy  Proximal Right Tibial Osteotomy, Possible Medial Femoral Condyle , Osteochondral Allograft  OSTEOTOMY TIBIA         Consultations:   OCCUPATIONAL THERAPY ADULT IP CONSULT  PHYSICAL THERAPY ADULT IP CONSULT             Hospital Course:   Patient did well post operatively.   Pain well controled with oral meds and PNB.  Tolerated diet, resumed normal bowel and bladder function.  Was seen by PT/OT prior to discharge who passed him on POD#1. Hemoglobin stable. Pt discharged to home in stable condition on POD#1.         Medications Prior to Admission:     Medications Prior to Admission   Medication Sig Dispense Refill Last Dose     atorvastatin (LIPITOR) 40 MG tablet Take 40 mg by mouth daily   12/18/2018 at 2000     losartan (COZAAR) 25 MG tablet Take 25 mg by mouth daily   12/19/2018 at 0600     PARoxetine (PAXIL) 10 MG tablet Take 10 mg by mouth every morning   12/19/2018 at 0600            Discharge Medications:        Review of your medicines      START taking      Dose / Directions   acetaminophen 325 MG tablet  Commonly known as:  TYLENOL  Used for:  Status post osteotomy      Dose:  975 mg  Take 3 tablets (975 mg) by mouth every 8 hours  Quantity:  100 tablet  Refills:  0     aspirin 81 MG EC tablet  Commonly known as:  ASA  Used for:  Status post osteotomy      Dose:  162 mg  Start taking on:  12/21/2018  Take 2 tablets (162 mg) by mouth daily  Quantity:  60 tablet  Refills:  0     hydrOXYzine 25 MG tablet  Commonly  known as:  ATARAX  Used for:  Status post osteotomy      Dose:  25 mg  Take 1 tablet (25 mg) by mouth every 6 hours as needed for itching  Quantity:  30 tablet  Refills:  0     morphine 15 MG CR tablet  Commonly known as:  MS CONTIN  Used for:  Status post osteotomy      Dose:  15 mg  Take 1 tablet (15 mg) by mouth every 12 hours for 3 doses  Quantity:  3 tablet  Refills:  0     order for DME      Equipment being ordered: Crutches ()  Treatment Diagnosis: impaired gait  Quantity:  1 each  Refills:  0     oxyCODONE 5 MG tablet  Commonly known as:  ROXICODONE  Used for:  Status post osteotomy      Dose:  5-10 mg  Take 1-2 tablets (5-10 mg) by mouth every 4 hours as needed for moderate to severe pain Insurance limits to apply.  Quantity:  60 tablet  Refills:  0     senna-docusate 8.6-50 MG tablet  Commonly known as:  SENOKOT-S/PERICOLACE  Used for:  Status post osteotomy      Dose:  1 tablet  Take 1 tablet by mouth 2 times daily as needed for constipation  Quantity:  60 tablet  Refills:  0        CONTINUE these medicines which have NOT CHANGED      Dose / Directions   atorvastatin 40 MG tablet  Commonly known as:  LIPITOR      Dose:  40 mg  Take 40 mg by mouth daily  Refills:  0     losartan 25 MG tablet  Commonly known as:  COZAAR      Dose:  25 mg  Take 25 mg by mouth daily  Refills:  0     PARoxetine 10 MG tablet  Commonly known as:  PAXIL      Dose:  10 mg  Take 10 mg by mouth every morning  Refills:  0           Where to get your medicines      These medications were sent to [facility]/pharmacy #[id number] [place], [place] - [address] [address]    Phone:  [phone]     acetaminophen 325 MG tablet    aspirin 81 MG EC tablet    hydrOXYzine 25 MG tablet    senna-docusate 8.6-50 MG tablet     Some of these will need a paper prescription and others can be bought over the counter. Ask your nurse if you have questions.    Bring a paper prescription for each of these  medications    morphine 15 MG CR tablet    order for DME    oxyCODONE 5 MG tablet                 Pending Results at Discharge:   None         Discharge Instructions:     Discharge Procedure Orders   Reason for your hospital stay   Order Comments: You had knee surgery.     Follow Up and recommended labs and tests   Order Comments: Follow up with Dr Rosado at [facility] at your appointed time. Usually around 8 days post op. If you do not have an appointment, call [facility] @@ [phone] and request an appointment.     Activity   Order Comments: Your activity upon discharge: as tolerated, hinged knee brace on locked in extension when up. Toe Touch weight bearing on the affected side. During your first week, rest and elevate, and ice your leg as much as possible to keep the swelling down     Order Specific Question Answer Comments   Is discharge order? Yes      When to contact your care team   Order Comments: Call your physician for fevers greater than 101.5, chills, increased pain, redness, swelling or discharge at the surgical site. During regular business hours call Dr Rosado's office and request to speak with his nurse or the triage nurses.  If you see him at [facility] call [phone]. After hours or on weekends call the hospital  at [phone] and ask to speak with the resident on call.     Wound care and dressings   Order Comments: Instructions to care for your wound at home: You may change your dressing on post op day 3-4. Wash your hands, roll down the tubi-, remove the old dressing, replace with a clean guaze and tape it in place, roll the tubi- back in to place.    You may remove the dressing on post op day 5 and shower with the dressing off if gthe wound is clean and dry. If not, cover the wound. Replace the dressing after showering. Notify your physician of any increase drainage.     Discharge Instructions   Order Comments: CPM for home use. Start 0-30 advance  as tolerated. Use 6-8 hours per day.     No future appointments.    BRAXTON Cano, CNP  Department of Orthopedic Surgery  [facility]  [phone]

## 2018-12-20 NOTE — PROGRESS NOTES
"Orthopaedic Surgery Progress Note     Dx: R Knee Medial  Compartment CondralLlesion  Tx: Right Knee Arthroscopy  Proximal Right Tibial Osteotomy, Possible Medial Femoral Condyle , Osteochondral Allograft  OSTEOTOMY TIBIA    E: No acute events overnight.    S: Pain well controlled, Notes numbness over anterior  knee  O:   /65 (BP Location: Right arm)   Pulse 78   Temp 97  F (36.1  C) (Oral)   Resp 14   Ht 1.702 m (5' 7\")   Wt 76.6 kg (168 lb 14 oz)   SpO2 93%   BMI 26.45 kg/m      Exam:  Gen: NAD, A/O x 3  Resp: Comfortable, non-labored breathing   Abd: soft, non-tender   RLE:  -Wound dressed, c/d/i  -Sens: SILT dp/sp/s/s/t  -Motor: 5/5 EHL/FHL/TA/GSc  -Vasc: 2+ pulses, wwp, brisk cap refill  Hemoglobin pending    Recent Labs   Lab 12/19/18  0745   HGB 14.7          Impression: 43 year old male s/p   Right Knee Arthroscopy  Proximal Right Tibial Osteotomy, Possible Medial Femoral Condyle , Osteochondral Allograft  OSTEOTOMY TIBIA 12-, doing well    Plan:  - Activity: up as tolerated, HNB on at all times. Locked in extension when up.  - Antibiotics: complete   - DVT prophylaxis:  mg x 30 days  - Wound Care: Dressing change prior to discharge  -CPM: 0-30, advance as tolerated, use 6-8 hrs per day  - Pain management: PNB/PO  - Physical Therapy: crutch training, ADLs, follow HTO protocol  - Dispo: Home pending progress with therapy/pain control  - Follow up: Follow up at [facility] with Dr. Rosado 12/27 @ 11:30am    Discussed with Dr Alonzo Kumar, APRN, CNP  Department of Orthopedic Surgery  [facility]  [phone]    For any questions regarding this patient please page me at the above number prior to contacting the ortho resident on call.        "

## 2018-12-20 NOTE — PLAN OF CARE
VS: Temp: 97  F (36.1  C) Temp src: Oral BP: 119/76 Pulse: 78 Heart Rate: 77 Resp: 16 SpO2: 92 % O2 Device: None (Room air)    O2: None (room air)   Output: Voiding adequately in urinal   Last BM: Small BM today   Activity: Up to bathroom with walker assist x 1   Skin: Intact except for incision   Pain: Pain well managed with prn oxycodone and scheduled morphine   CMS: Intact   Dressing: CDI   Diet: Regular   LDA: PIV in left hand   Equipment: Walker, CPM, IV pump and pole   Plan: Continue to monitor   Additional Info:

## 2018-12-20 NOTE — PLAN OF CARE
Discharge Planner PT   Patient plan for discharge: home with out-pt PT next week  Current status: Pt evaluated this morning.  Instructed in TTWB RLE with crutches and knee brace locked in extension.  Climbed stairs with left railing and crutch.  Instructed in isometrics and gentle ROM in sitting as tolerated.    Barriers to return to prior living situation: none  Recommendations for discharge: home with family to assist and out-pt PT follow-up.   Rationale for recommendations: Pt tolerated activity well.  Good pain control presently.  Home with out-pt PT       Entered by: Linh Mendez 12/20/2018 9:33 AM

## 2018-12-20 NOTE — PLAN OF CARE
Focus: Pain and Activity.  D: Patient A&O times 4; VSS; LS clear an BS+; c/o pain to right knee and verbalized pain radiates to right leg/ankle.  I: Oxycodone and scheduled pain meds given; using ice pack to right knee; IV antibiotic infused; patient up with assist of 1 staff; diet advanced; IS use encouraged.  A: Dangled at bedside and tolerated activity well; tolerated full liquid diet; tolerating CPM at 30 degrees flexion; CAPNO monitoring; PIV patent, intact and IV fluid infusing; spouse and children visited during shift; dressing CD&I.  P: Continue to assess pain.

## 2018-12-20 NOTE — PLAN OF CARE
"  VS: /68 (BP Location: Right arm)   Pulse 82   Temp 97.8  F (36.6  C) (Oral)   Resp 16   Ht 1.702 m (5' 7\")   Wt 76.6 kg (168 lb 14 oz)   SpO2 91%   BMI 26.45 kg/m       O2: Room air   Output: Voids spontaneously without difficulty   Last BM: 12/19   Activity: Up in halls, assist of 1   Up for meals? Refused at breakfast   Skin: Incision   Pain: Scheduled morphine, 5-10 oxycodone q 3   CMS: Intact   Dressing: CDI   Diet: Regular   LDA: PIV saline locked   Equipment: IV pump and poll, PCD, CPM,   Plan: Discharge this PM home   Additional Info:          "

## 2018-12-20 NOTE — PLAN OF CARE
Physical Therapy Discharge Summary    Reason for therapy discharge:    Discharged to home with outpatient therapy.    Progress towards therapy goal(s). See goals on Care Plan in [facility] electronic health record for goal details.  Goals met    Therapy recommendation(s):    Continued therapy is recommended.  Rationale/Recommendations:  Pt has home PT set up for next week.  Will continue with isometric quads, gluts, AP's  and gentle ROM. .

## 2018-12-21 ENCOUNTER — PATIENT OUTREACH (OUTPATIENT)
Dept: CARE COORDINATION | Facility: CLINIC | Age: 43
End: 2018-12-21

## 2018-12-21 NOTE — PROGRESS NOTES
" 12/20/18 0874   Quick Adds   Type of Visit Initial PT Evaluation   Living Environment   Lives With spouse;child(bernadette), dependent  (6 kids all together, 21, 19,18, 17, 7,8)   Living Arrangements house   Living Environment Comment side split level home , 2 steps to enter and then 7 steps to main living level.  Walk-in shower on the main level.    Self-Care   Usual Activity Tolerance fair   Current Activity Tolerance fair   Equipment Currently Used at Home none   Activity/Exercise/Self-Care Comment Independent with ADL's,   for  equipment, walks a little with his dogs   Functional Level Prior   Ambulation 0-->independent   Transferring 0-->independent   Toileting 0-->independent   Bathing 0-->independent   Communication 0-->understands/communicates without difficulty   Swallowing 0-->swallows foods/liquids without difficulty   Cognition 0 - no cognition issues reported   Fall history within last six months no   Prior Functional Level Comment Pain in the knee, Baker's cyst, \"crunching in the knee\" couldn't run much because of knee pain. Walking for for \"1/2 day\"   General Information   Onset of Illness/Injury or Date of Surgery - Date 12/19/18   Referring Physician Dr. Agustin Rosado   Patient/Family Goals Statement Pt wants to be able to be out of pain.  Wants to skate and play with his kids. \"[place].\"   Pertinent History of Current Problem (include personal factors and/or comorbidities that impact the POC) POD 1 osteotomy right prox tib, secondary to chondral lesion.  PMH:  see history in chart,  non-contributory.    Precautions/Limitations (HKB, locked in extension with walking)   Weight-Bearing Status - RLE toe touch weight-bearing   General Observations capnography 8-9, CPM at 0-30   General Info Comments Pt has used crutches in the past.    Cognitive Status Examination   Orientation orientation to person, place and time   Level of Consciousness alert   Follows Commands and Answers " "Questions 100% of the time   Personal Safety and Judgment intact   Memory intact   Pain Assessment   Patient Currently in Pain No   Integumentary/Edema   Integumentary/Edema Comments leg covered in stockinette under HKB.    Range of Motion (ROM)   ROM Comment 0-30 on CPM.  Achieved about 50 degrees in sitting at EOB with brace unlocked.    Strength   Strength Comments DF at least 3+/5,  quad contraction is weak, Pt functionally able to perform independent SLR with brace locked in extension.    Bed Mobility   Bed Mobility Comments independent.    Transfer Skills   Transfer Comments independent   Gait   Gait Comments walked 50' with ww then progressed to crutches. Understands TTWB but preferes to NWB at this time.  Quite steady overall   Balance   Balance Comments bilateral UE support required due to TTWB status.    Sensory Examination   Sensory Perception Comments diminished RLE   Modality Interventions   Planned Modality Interventions Cryotherapy   General Therapy Interventions   Intervention Comments gait training, strengthening    Clinical Impression   Criteria for Skilled Therapeutic Intervention yes, treatment indicated   PT Diagnosis impaired gait   Influenced by the following impairments decreased strength and ROM RLE   Functional limitations due to impairments decreased independence with gait   Clinical Presentation Stable/Uncomplicated   Clinical Presentation Rationale tolerated activity well    Clinical Decision Making (Complexity) Low complexity   Therapy Frequency` daily   Predicted Duration of Therapy Intervention (days/wks) 1 day   Anticipated Equipment Needs at Discharge crutches   Anticipated Discharge Disposition Home with Outpatient Therapy   Risk & Benefits of therapy have been explained Yes   Patient, Family & other staff in agreement with plan of care Yes   [facility] AM-PAC TM \"6 Clicks\"   2016, Trustees of [facility], under license to [employer].  All rights reserved.   6 Clicks " "Short Forms Basic Mobility Inpatient Short Form   [facility] AM-PAC  \"6 Clicks\" V.2 Basic Mobility Inpatient Short Form   1. Turning from your back to your side while in a flat bed without using bedrails? 4 - None   2. Moving from lying on your back to sitting on the side of a flat bed without using bedrails? 4 - None   3. Moving to and from a bed to a chair (including a wheelchair)? 4 - None   4. Standing up from a chair using your arms (e.g., wheelchair, or bedside chair)? 4 - None   5. To walk in hospital room? 4 - None   6. Climbing 3-5 steps with a railing? 4 - None   Basic Mobility Raw Score (Score out of 24.Lower scores equate to lower levels of function) 24   Total Evaluation Time   Total Evaluation Time (Minutes) 5     "

## 2018-12-23 NOTE — OP NOTE
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  Right knee varus malalignment.      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   Right knee varus malalignment.      SURGEON:  Agustin Rosado MD       ASSISTANT:  Sriram Ritter PA-C      The physician assistant was required to provide retraction for the osteochondral allograft implantation.  In addition, the physician assistant was required to help prepare the allograft osteochondral plug.  The physician assistant was also necessary to assist with the osteotomy.  She helped with retraction.  In addition, she prepared the bone grafting material and helped with application of the guide.      SECOND ASSISTANT:  EDINSON MARROQUIN MD, orthopedic fellow.      ANESTHESIA:  General plus femoral nerve block.      ESTIMATED BLOOD LOSS:  None.      COUNTS:  Sponge and needle count were correct.       MATERIAL FORWARDED TO THE LAB:  None.      OPERATION PERFORMED:   1.  Right knee proximal tibial osteotomy.   2.  Right knee medial femoral condyle osteochondral allograft transplantation.   3.  Right knee diagnostic arthroscopy.      INDICATIONS:  Agustin Jacinto is a 43-year-old active male with right knee pain, swelling and mechanical symptoms.  He has varus malalignment as well as a medial femoral condyle chondral lesion.  He had a previous arthroscopy which provided only short-term relief.  He has had nonsurgical treatment.  Agustin is frustrated at this time.  He is not interested in arthroplasty.  I had a long conversation with the patient regarding cartilage restoration and realignment osteotomy.  I explained the surgery in detail. I explained that the surgeries, does not \"cure\" arthritis.  This may progress over time.  We discussed the risks of surgery including bleeding, infection, nerve damage, complications from anesthesia, blood clot, etc.  I also explained the more pertinent " risks with this type of surgery, including failure to relieve his symptoms.  We may make his symptoms worse.  He may lose range of motion or develop scar tissue that could be problematic.  There is a possibility that osteochondral allograft fails.  The osteotomy may not heal or go on to delayed healing.  There could be over or under correction.  There can be hardware complications.  The patient may have pain or numbness from the incisions.  He may go on to require arthroplasty in the future.  Mr. Jacinto has had a chance to have his questions answered.  He understands the surgery as well as the surgical risks and would like to proceed.      OPERATIVE FINDINGS:  Examination under anesthesia revealed symmetrical range of motion.  Trace effusion.  Ligaments are stable.  The diagnostic arthroscopy revealed a normal-appearing suprapatellar pouch.  There was a minimal area of grade 2 chondromalacia in the central ridge of the patella.  The trochlea was intact.  The lateral compartment revealed normal lateral meniscus, normal articular cartilage.  The notch reveals intact cruciate ligaments.  The medial compartment revealed some diffuse chondral thinning of the tibial plateau, but no significant chondral lesions.  There is a previous meniscectomy with approximately 60% of the meniscus remaining.  The patient has a large grade 3 and grade 4 chondral lesion involving the medial femoral condyle.  The lesion measures approximately 24 mm in length x 22 mm in width.  Lesion is not contained on the notch side.      IMPLANTS:  Arthrex 8 degree iBalance implant with 4.5 PEEK screws x 2 distally and 6.5 PEEK screws proximally.  JRF osteochondral allograft.  DBX and allograft bone chips.      DESCRIPTION OF PROCEDURE:  After the patient was counseled and plans, alternatives and risks were discussed, consent was obtained.  The correct operative extremity was marked in the preoperative holding area.  Preoperative antibiotics were  administered.  An Exparel femoral nerve block was administered. The patient was brought back to the operating suite and administered a general anesthetic.  Examination under anesthesia was performed and findings are noted above.  The right lower extremity was prepped and draped in the usual sterile fashion.  A timeout process was completed.  Standard anterolateral arthroscopy portal was created followed by superomedial portal for the outflow cannula.  A thorough diagnostic arthroscopy was undertaken and the findings are noted above.  I did feel the patient was a good candidate for cartilage restoration.  The arthroscopic instruments were withdrawn.      An anteromedial longitudinal incision was created.  Hemostasis obtained with electrocautery.  Dissection carried through subcutaneous tissue and down to the medial retinaculum.  The medial retinaculum was incised and a quadriceps split type medial parapatellar arthrotomy was created.  This allowed exposure of the medial femoral condyle chondral lesion.  A 25 mm sizer nicely covered the lesion.  A centering pin was placed.  The lesion was scored.  A reamer was used to create the recipient socket approximately 8 mm in depth.  The base of the lesion was drilled.  Attention was now turned to preparing the allograft.  A trephine reamer was used to harvest a donor plug.  The donor plug was trimmed to fit within the recipient socket.  The plug was lavaged with pulse lavage to remove marrow elements.  The recipient socket was now dilated.  The plug was gently impacted into place.  The fit was excellent.  It was slightly recessed in the posterior aspect which I felt was ideal.  The knee was copiously irrigated and the arthrotomy was reapproximated with 0 Vicryl and Ethibond suture.      Attention was now turned to the osteotomy.  The incision was extended inferiorly.  An L-shaped periosteal flap was elevated.  Careful subperiosteal dissection was carried out along the medial  and posteromedial aspect of the tibia.  The iBalance guide was positioned and the slope was matched.  The reamer was used to create the channels for the implant.  The hinge pin was now placed parallel to the tibial slope, 1.5-2 cm distal to the joint line, 1 cm from the lateral cortex.  The neurovascular shield was placed.  The saw was used to create the osteotomy.  The osteotomy was now slowly opened 8 degrees.  The alignment angeles confirmed that the weightbearing axis fell on the lateral tibial spine which I felt was ideal.  A mixture of demineralized bone matrix and allograft bone chips were packed into the osteotomy site.  The 8-degree iBalance was positioned.  The implant was held in position with two 6.5 PEEK screws proximally, two 4.5 PEEK screws distally.  AP and lateral fluoroscopy confirmed excellent position of the osteotomy.  The lateral cortex was intact.  The incision was copiously lavaged and additional bone graft material was positioned.  The periosteal flap was reapproximated.  Subcutaneous tissue closed with 2-0 Vicryl, skin closed with Monocryl.  Sterile dressing was applied.  The patient was placed in the hinged knee brace.  He was extubated on the operating room table and taken to the recovery room in good condition.  He tolerated the procedure well and there were no complications.  Estimated blood loss was 100 mL.  A tourniquet was not used.      DISPOSITION:  The patient will be admitted to the orthopedic floor for 24-48 hour stay for pain control and observation.  His weightbearing status will be strict touchdown weightbearing for 6 weeks.  He may do range of motion exercises within his hinged knee brace.  His dressing will be changed on postoperative day #2 prior to discharge,  The patient will follow up with me on postoperative day #8 at [facility].  AP and lateral radiographs will be obtained at that visit.         MARIA ELENA WALKER MD             D: 12/20/2018   T: 12/23/2018    MT: GH      Name:     MARIA ELENA URRUTIA   MRN:      [id number]        Account:        [id number]   :      1975           Procedure Date: 2018      Document: [id number]       cc: Gaston Stevenson MD

## 2024-10-24 ENCOUNTER — APPOINTMENT (OUTPATIENT)
Dept: URBAN - METROPOLITAN AREA CLINIC 259 | Age: 49
Setting detail: DERMATOLOGY
End: 2024-10-24

## 2024-10-24 VITALS — HEIGHT: 67 IN | WEIGHT: 171 LBS

## 2024-10-24 DIAGNOSIS — L40.0 PSORIASIS VULGARIS: ICD-10-CM

## 2024-10-24 DIAGNOSIS — L21.8 OTHER SEBORRHEIC DERMATITIS: ICD-10-CM

## 2024-10-24 PROCEDURE — OTHER MIPS QUALITY: OTHER

## 2024-10-24 PROCEDURE — 99204 OFFICE O/P NEW MOD 45 MIN: CPT

## 2024-10-24 PROCEDURE — OTHER PSORIATIC EPIDEMIOLOGY SCREENING TOOL (PEST): OTHER

## 2024-10-24 PROCEDURE — OTHER PRESCRIPTION MEDICATION MANAGEMENT: OTHER

## 2024-10-24 PROCEDURE — OTHER PRESCRIPTION: OTHER

## 2024-10-24 PROCEDURE — OTHER COUNSELING: OTHER

## 2024-10-24 RX ORDER — KETOCONAZOLE 20 MG/ML
SHAMPOO, SUSPENSION TOPICAL
Qty: 120 | Refills: 2 | Status: ERX | COMMUNITY
Start: 2024-10-24

## 2024-10-24 RX ORDER — NYSTATIN AND TRIAMCINOLONE ACETONIDE 100000; 1 [USP'U]/G; MG/G
CREAM TOPICAL
Qty: 30 | Refills: 2 | Status: ERX | COMMUNITY
Start: 2024-10-24

## 2024-10-24 RX ORDER — CLOBETASOL PROPIONATE 0.5 MG/G
OINTMENT TOPICAL
Qty: 30 | Refills: 2 | Status: ERX | COMMUNITY
Start: 2024-10-24

## 2024-10-24 ASSESSMENT — LOCATION SIMPLE DESCRIPTION DERM
LOCATION SIMPLE: RIGHT CHEEK
LOCATION SIMPLE: SCALP
LOCATION SIMPLE: CHIN
LOCATION SIMPLE: RIGHT SCALP
LOCATION SIMPLE: LEFT HAND
LOCATION SIMPLE: RIGHT HAND
LOCATION SIMPLE: LEFT EAR
LOCATION SIMPLE: LEFT CHEEK

## 2024-10-24 ASSESSMENT — LOCATION DETAILED DESCRIPTION DERM
LOCATION DETAILED: RIGHT INFERIOR MEDIAL MALAR CHEEK
LOCATION DETAILED: RIGHT CENTRAL POSTAURICULAR SKIN
LOCATION DETAILED: RIGHT CENTRAL FRONTAL SCALP
LOCATION DETAILED: LEFT CHIN
LOCATION DETAILED: LEFT RADIAL DORSAL HAND
LOCATION DETAILED: LEFT CYMBA CONCHA
LOCATION DETAILED: RIGHT RADIAL DORSAL HAND
LOCATION DETAILED: RIGHT SUPERIOR FRONTAL SCALP
LOCATION DETAILED: LEFT INFERIOR MEDIAL MALAR CHEEK

## 2024-10-24 ASSESSMENT — LOCATION ZONE DERM
LOCATION ZONE: FACE
LOCATION ZONE: EAR
LOCATION ZONE: SCALP
LOCATION ZONE: HAND

## 2024-10-24 ASSESSMENT — ITCH NUMERIC RATING SCALE: HOW SEVERE IS YOUR ITCHING?: 5

## 2024-10-24 ASSESSMENT — BSA PSORIASIS: % BODY COVERED IN PSORIASIS: 1

## 2024-10-24 NOTE — PROCEDURE: PRESCRIPTION MEDICATION MANAGEMENT
Initiate Treatment: Clobetasol 0.05% ointment BID PRN
Detail Level: Zone
Render In Strict Bullet Format?: No
Discontinue Regimen: Triamcinolone 0.1% cream
Initiate Treatment: Ketoconazole 2% shampoo QD\\nnystatin-triamcinolone 100,000 unit/g-0.1 % topical cream BID PRN

## 2024-10-24 NOTE — HPI: RASH
What Type Of Note Output Would You Prefer (Optional)?: Standard Output
Is This A New Presentation, Or A Follow-Up?: Rash
Additional History: The patient reports he has been getting dry, itchy spots on his dorsal hands for 1-3 years. He states he was prescribed triamcinolone cream for hemorrhoids and has been using it on other areas as needed. He states triamcinolone cream has not been helpful. He uses utterly smooth lotion on his hands.\\n\\nHe states he has a red, itchy patch on the right side of his forehead along the hairline. He states it has been present for 6 months and won’t heal. The triamcinolone cream has not been helpful here either. He also has a patch behind his right ear that he picks at. He endorses some dandruff on his scalp and states he uses OTC dandruff shampoo.\\n\\nHe states for a couple years, he has intermittently had a rash around the mouth and on the chin in his beard/mustache. He states he uses the triamcinolone cream on the rash twice daily usually about 2 weeks per month and it helps to get the rash under control. \\n\\nHand stiffness every once in a while. He denies pain in the heels or feet. He does report a history of partial right knee replacement.

## 2024-10-24 NOTE — PROCEDURE: PSORIATIC EPIDEMIOLOGY SCREENING TOOL (PEST)
Have You Ever Had A Finger Or Toe That Was Completely Swollen And Painful For No Apparent Reason?: No
Detail Level: Simple
Positive Screening Text: A score of 3 or greater is considered a positive PEST score.
Negative Screening Text: A score of less than 3 is considered a negative PEST score.

## 2025-01-09 ENCOUNTER — APPOINTMENT (OUTPATIENT)
Dept: URBAN - METROPOLITAN AREA CLINIC 259 | Age: 50
Setting detail: DERMATOLOGY
End: 2025-01-09

## 2025-01-09 VITALS — WEIGHT: 171 LBS | HEIGHT: 67 IN

## 2025-01-09 DIAGNOSIS — L21.8 OTHER SEBORRHEIC DERMATITIS: ICD-10-CM

## 2025-01-09 DIAGNOSIS — L40.0 PSORIASIS VULGARIS: ICD-10-CM

## 2025-01-09 PROCEDURE — OTHER PRESCRIPTION MEDICATION MANAGEMENT: OTHER

## 2025-01-09 PROCEDURE — OTHER COUNSELING: OTHER

## 2025-01-09 PROCEDURE — OTHER MIPS QUALITY: OTHER

## 2025-01-09 PROCEDURE — 99214 OFFICE O/P EST MOD 30 MIN: CPT

## 2025-01-09 ASSESSMENT — LOCATION DETAILED DESCRIPTION DERM
LOCATION DETAILED: LEFT CHIN
LOCATION DETAILED: RIGHT SUPERIOR FRONTAL SCALP
LOCATION DETAILED: RIGHT RADIAL DORSAL HAND

## 2025-01-09 ASSESSMENT — LOCATION ZONE DERM
LOCATION ZONE: SCALP
LOCATION ZONE: FACE
LOCATION ZONE: HAND

## 2025-01-09 ASSESSMENT — LOCATION SIMPLE DESCRIPTION DERM
LOCATION SIMPLE: SCALP
LOCATION SIMPLE: CHIN
LOCATION SIMPLE: RIGHT HAND

## 2025-01-09 NOTE — PROCEDURE: PRESCRIPTION MEDICATION MANAGEMENT
Continue Regimen: Clobetasol 0.05% ointment BID PRN. Ok to use on scalp in addition to hands.
Plan: Patient still has refills at home. Ok for him to call for refills within a year.
Detail Level: Zone
Render In Strict Bullet Format?: No
Continue Regimen: Ketoconazole 2% shampoo QD\\nnystatin-triamcinolone 100,000 unit/g-0.1 % topical cream BID PRN

## (undated) DEVICE — GLOVE PROTEXIS W/NEU-THERA 7.5  2D73TE75

## (undated) DEVICE — STRAP KNEE/BODY 31143004

## (undated) DEVICE — SOL WATER IRRIG 1000ML BOTTLE 2F7114

## (undated) DEVICE — KEYHOLE REAMER ARTHREX HTO IBALANCE AR-13425

## (undated) DEVICE — DRAPE C-ARMOR 5 SIDED 5523

## (undated) DEVICE — DRSG STERI STRIP 1/2X4" R1547

## (undated) DEVICE — Device

## (undated) DEVICE — SU MONOCRYL 3-0 PS-1 27" Y936H

## (undated) DEVICE — SU ETHIBOND 1 CT-1 30" X425H

## (undated) DEVICE — SYR 10ML FINGER CONTROL W/O NDL 309695

## (undated) DEVICE — BLADE SAW SAGITTAL STRK 19.5X90X1.27MM 2108-109-000S11

## (undated) DEVICE — ESU GROUND PAD ADULT W/CORD E7507

## (undated) DEVICE — SU ETHILON 3-0 PS-1 18" 1663H

## (undated) DEVICE — ESU PENCIL W/SMOKE EVAC NEPTUNE STRYKER 0703-046-000

## (undated) DEVICE — DRILL ANCHOR AO CONNECTION ARTHREX HTO IBALANCE AR-13434-02

## (undated) DEVICE — NDL 22GA 1.5"

## (undated) DEVICE — SU VICRYL 2-0 CT-1 27" UND J259H

## (undated) DEVICE — PACK ACL SUPPLEMENT STD

## (undated) DEVICE — PIN FIXATION ARTHREX HTO IBALANCE AR-13422

## (undated) DEVICE — GLOVE PROTEXIS W/NEU-THERA 7.0  2D73TE70

## (undated) DEVICE — DRAPE U-DRAPE 1015NSD NON-STERILE

## (undated) DEVICE — DRAPE C-ARM W/STRAPS 42X72" 07-CA104

## (undated) DEVICE — DECANTER TRANSFER DEVICE 2008S

## (undated) DEVICE — PREP CHLORAPREP 26ML TINTED ORANGE  260815

## (undated) DEVICE — COVER CAMERA IN-LIGHT DISP LT-C02

## (undated) DEVICE — LINEN TOWEL PACK X5 5464

## (undated) DEVICE — SOL NACL 0.9% IRRIG 3000ML BAG 2B7477

## (undated) DEVICE — SU VICRYL 0 CT 36" J358H

## (undated) DEVICE — PIN GUIDE ARTHREX 2.4MM DRILL  AR-1250L

## (undated) DEVICE — SPONGE LAP 18X18" X8435

## (undated) DEVICE — DRAPE TIBURON TOP SHEET 100X60" 29352

## (undated) DEVICE — SUCTION IRR SYSTEM W/O TIP INTERPULSE HANDPIECE 0210-100-000

## (undated) DEVICE — GOWN XLG DISP 9545

## (undated) DEVICE — TUBING SUCTION MEDI-VAC 1/4"X20' N620A

## (undated) DEVICE — BONE CLEANING TIP INTERPULSE  0210-010-000

## (undated) DEVICE — SOL NACL 0.9% IRRIG 1000ML BOTTLE 2F7124

## (undated) DEVICE — SU VICRYL 0 CT-1 27" UND J260H

## (undated) DEVICE — SUCTION MANIFOLD DORNOCH ULTRA CART UL-CL500

## (undated) DEVICE — GLOVE PROTEXIS POWDER FREE 7.0 ORTHOPEDIC 2D73ET70

## (undated) DEVICE — GOWN IMPERVIOUS SPECIALTY XLG/XLONG 32474

## (undated) DEVICE — PEN MARKING SKIN W/PAPER RULER 31145785

## (undated) DEVICE — NDL 18GA 1.5" 305196

## (undated) DEVICE — LINEN GOWN XLG 5407

## (undated) DEVICE — LINEN ORTHO PACK 5446

## (undated) DEVICE — TUBING ARTHRO CONMED/LINVATEC PUMP BLUE INFLOW 10K100

## (undated) RX ORDER — HYDROMORPHONE HYDROCHLORIDE 1 MG/ML
INJECTION, SOLUTION INTRAMUSCULAR; INTRAVENOUS; SUBCUTANEOUS
Status: DISPENSED
Start: 2018-12-19

## (undated) RX ORDER — BUPIVACAINE HYDROCHLORIDE AND EPINEPHRINE 2.5; 5 MG/ML; UG/ML
INJECTION, SOLUTION EPIDURAL; INFILTRATION; INTRACAUDAL; PERINEURAL
Status: DISPENSED
Start: 2018-12-19

## (undated) RX ORDER — FENTANYL CITRATE 50 UG/ML
INJECTION, SOLUTION INTRAMUSCULAR; INTRAVENOUS
Status: DISPENSED
Start: 2018-12-19

## (undated) RX ORDER — ONDANSETRON 2 MG/ML
INJECTION INTRAMUSCULAR; INTRAVENOUS
Status: DISPENSED
Start: 2018-12-19

## (undated) RX ORDER — PROPOFOL 10 MG/ML
INJECTION, EMULSION INTRAVENOUS
Status: DISPENSED
Start: 2018-12-19

## (undated) RX ORDER — GABAPENTIN 300 MG/1
CAPSULE ORAL
Status: DISPENSED
Start: 2018-12-19

## (undated) RX ORDER — LIDOCAINE HYDROCHLORIDE 20 MG/ML
INJECTION, SOLUTION EPIDURAL; INFILTRATION; INTRACAUDAL; PERINEURAL
Status: DISPENSED
Start: 2018-12-19

## (undated) RX ORDER — ACETAMINOPHEN 325 MG/1
TABLET ORAL
Status: DISPENSED
Start: 2018-12-19

## (undated) RX ORDER — DEXAMETHASONE SODIUM PHOSPHATE 4 MG/ML
INJECTION, SOLUTION INTRA-ARTICULAR; INTRALESIONAL; INTRAMUSCULAR; INTRAVENOUS; SOFT TISSUE
Status: DISPENSED
Start: 2018-12-19